# Patient Record
Sex: FEMALE | URBAN - METROPOLITAN AREA
[De-identification: names, ages, dates, MRNs, and addresses within clinical notes are randomized per-mention and may not be internally consistent; named-entity substitution may affect disease eponyms.]

---

## 2019-02-21 ENCOUNTER — APPOINTMENT (OUTPATIENT)
Dept: URBAN - METROPOLITAN AREA CLINIC 319 | Age: 74
Setting detail: DERMATOLOGY
End: 2019-02-21

## 2019-02-21 DIAGNOSIS — R23.3 SPONTANEOUS ECCHYMOSES: ICD-10-CM

## 2019-02-21 PROCEDURE — OTHER COUNSELING: OTHER

## 2019-02-21 PROCEDURE — OTHER TREATMENT REGIMEN: OTHER

## 2019-02-21 PROCEDURE — 99202 OFFICE O/P NEW SF 15 MIN: CPT

## 2019-02-21 PROCEDURE — OTHER MIPS QUALITY: OTHER

## 2019-02-21 ASSESSMENT — LOCATION SIMPLE DESCRIPTION DERM: LOCATION SIMPLE: LEFT KNEE

## 2019-02-21 ASSESSMENT — LOCATION DETAILED DESCRIPTION DERM: LOCATION DETAILED: LEFT KNEE

## 2019-02-21 ASSESSMENT — LOCATION ZONE DERM: LOCATION ZONE: LEG

## 2019-02-21 NOTE — HPI: DISCOLORATION
How Severe Is Your Skin Discoloration?: mild
Additional History: Patient states she got a steroid injection in her left knee back in August [address] by Dr. Hanna. Patient states every since then she has had a discoloration at the injection site. She stated it never completely goes away, but will get smaller over time. Patient states nothing makes it better or worse.

## 2020-06-09 ENCOUNTER — APPOINTMENT (OUTPATIENT)
Dept: URBAN - METROPOLITAN AREA CLINIC 320 | Age: 75
Setting detail: DERMATOLOGY
End: 2020-06-15

## 2020-06-09 DIAGNOSIS — Z41.9 ENCOUNTER FOR PROCEDURE FOR PURPOSES OTHER THAN REMEDYING HEALTH STATE, UNSPECIFIED: ICD-10-CM

## 2020-06-09 PROCEDURE — OTHER JUVEDERM ULTRA PLUS XC INJECTION: OTHER

## 2020-06-09 PROCEDURE — OTHER BOTOX: OTHER

## 2020-06-09 ASSESSMENT — LOCATION DETAILED DESCRIPTION DERM
LOCATION DETAILED: LEFT MEDIAL FOREHEAD
LOCATION DETAILED: RIGHT SUPERIOR MEDIAL FOREHEAD
LOCATION DETAILED: LEFT SUPERIOR LATERAL MALAR CHEEK
LOCATION DETAILED: LEFT SUPERIOR LATERAL FOREHEAD
LOCATION DETAILED: GLABELLA
LOCATION DETAILED: RIGHT MEDIAL FOREHEAD
LOCATION DETAILED: LEFT FOREHEAD
LOCATION DETAILED: RIGHT LATERAL EYEBROW
LOCATION DETAILED: RIGHT SUPERIOR CENTRAL MALAR CHEEK
LOCATION DETAILED: RIGHT SUPERIOR FOREHEAD
LOCATION DETAILED: LEFT INFERIOR TEMPLE
LOCATION DETAILED: LEFT LATERAL FOREHEAD
LOCATION DETAILED: LEFT SUPERIOR MEDIAL FOREHEAD
LOCATION DETAILED: RIGHT INFERIOR TEMPLE
LOCATION DETAILED: RIGHT CENTRAL EYEBROW
LOCATION DETAILED: LEFT INFERIOR MEDIAL FOREHEAD
LOCATION DETAILED: LEFT LATERAL EYEBROW
LOCATION DETAILED: RIGHT FOREHEAD
LOCATION DETAILED: LEFT SUPERIOR FOREHEAD

## 2020-06-09 ASSESSMENT — LOCATION SIMPLE DESCRIPTION DERM
LOCATION SIMPLE: RIGHT TEMPLE
LOCATION SIMPLE: LEFT EYEBROW
LOCATION SIMPLE: LEFT TEMPLE
LOCATION SIMPLE: LEFT FOREHEAD
LOCATION SIMPLE: RIGHT CHEEK
LOCATION SIMPLE: RIGHT FOREHEAD
LOCATION SIMPLE: LEFT CHEEK
LOCATION SIMPLE: GLABELLA
LOCATION SIMPLE: RIGHT EYEBROW

## 2020-06-09 ASSESSMENT — LOCATION ZONE DERM: LOCATION ZONE: FACE

## 2020-06-09 NOTE — PROCEDURE: BOTOX
Price (Use Numbers Only, No Special Characters Or $): 949 Price (Use Numbers Only, No Special Characters Or $): 179

## 2020-06-09 NOTE — PROCEDURE: JUVEDERM ULTRA PLUS XC INJECTION
Price (Use Numbers Only, No Special Characters Or $): 693 Price (Use Numbers Only, No Special Characters Or $): 362

## 2020-06-10 ENCOUNTER — APPOINTMENT (OUTPATIENT)
Dept: URBAN - METROPOLITAN AREA CLINIC 320 | Age: 75
Setting detail: DERMATOLOGY
End: 2020-06-10

## 2020-06-23 ENCOUNTER — APPOINTMENT (OUTPATIENT)
Dept: URBAN - METROPOLITAN AREA CLINIC 320 | Age: 75
Setting detail: DERMATOLOGY
End: 2020-06-26

## 2020-06-23 DIAGNOSIS — Z41.9 ENCOUNTER FOR PROCEDURE FOR PURPOSES OTHER THAN REMEDYING HEALTH STATE, UNSPECIFIED: ICD-10-CM

## 2020-06-23 PROCEDURE — OTHER LUMENIS M22: OTHER

## 2020-06-23 ASSESSMENT — LOCATION SIMPLE DESCRIPTION DERM
LOCATION SIMPLE: LEFT CHEEK
LOCATION SIMPLE: RIGHT CHEEK
LOCATION SIMPLE: LEFT FOREHEAD
LOCATION SIMPLE: RIGHT FOREHEAD

## 2020-06-23 ASSESSMENT — LOCATION DETAILED DESCRIPTION DERM
LOCATION DETAILED: LEFT INFERIOR CENTRAL MALAR CHEEK
LOCATION DETAILED: LEFT FOREHEAD
LOCATION DETAILED: RIGHT FOREHEAD
LOCATION DETAILED: LEFT MEDIAL FOREHEAD
LOCATION DETAILED: RIGHT INFERIOR CENTRAL MALAR CHEEK

## 2020-06-23 ASSESSMENT — LOCATION ZONE DERM: LOCATION ZONE: FACE

## 2020-06-23 NOTE — PROCEDURE: LUMENIS M22
Treated Area: large area
External Cooling Fan Speed: 0
Topical Anesthesia?: BLT cream (benzocaine 20%, lidocaine 6%, tetracaine 4%)
Price (Use Numbers Only, No Special Characters Or $): 400
Fluence Units: J/cm2
Number Of Passes: 2
Pulse Delay (In Milliseconds): 5
Procedure Text: The patient's skin was cleaned and the procedure was performed using the parameters noted above.
Length Of Topical Anesthesia Application (Optional): 15 minutes
Total Pulses: 95
Post-Care Instructions: I reviewed with the patient in detail post-care instructions. Patient should stay away from the sun and wear sun protection until treated areas are fully healed.
Treatment Number: 1
Pulse Duration (In Milliseconds): 3
Skin Type (Optional): III
Detail Level: Zone
Fluence: 14
Consent: Written consent obtained, risks reviewed including but not limited to crusting, scabbing, blistering, scarring, darker or lighter pigmentary change, bruising, and/or incomplete response.

## 2020-07-23 ENCOUNTER — APPOINTMENT (OUTPATIENT)
Dept: URBAN - METROPOLITAN AREA CLINIC 320 | Age: 75
Setting detail: DERMATOLOGY
End: 2020-07-28

## 2020-07-23 DIAGNOSIS — Z41.9 ENCOUNTER FOR PROCEDURE FOR PURPOSES OTHER THAN REMEDYING HEALTH STATE, UNSPECIFIED: ICD-10-CM

## 2020-07-23 PROCEDURE — OTHER RESTYLANE LYFT INJECTION: OTHER

## 2020-07-23 PROCEDURE — OTHER BOTOX: OTHER

## 2020-07-23 ASSESSMENT — LOCATION DETAILED DESCRIPTION DERM
LOCATION DETAILED: RIGHT INFERIOR FOREHEAD
LOCATION DETAILED: LEFT INFERIOR FOREHEAD
LOCATION DETAILED: LEFT MID TEMPLE
LOCATION DETAILED: LEFT INFERIOR TEMPLE
LOCATION DETAILED: LEFT INFERIOR LATERAL FOREHEAD
LOCATION DETAILED: RIGHT MID TEMPLE
LOCATION DETAILED: RIGHT INFERIOR TEMPLE
LOCATION DETAILED: LEFT SUPERIOR LATERAL MALAR CHEEK

## 2020-07-23 ASSESSMENT — LOCATION SIMPLE DESCRIPTION DERM
LOCATION SIMPLE: RIGHT TEMPLE
LOCATION SIMPLE: LEFT FOREHEAD
LOCATION SIMPLE: LEFT CHEEK
LOCATION SIMPLE: LEFT TEMPLE
LOCATION SIMPLE: RIGHT FOREHEAD

## 2020-07-23 ASSESSMENT — LOCATION ZONE DERM: LOCATION ZONE: FACE

## 2020-07-23 NOTE — PROCEDURE: BOTOX
Price (Use Numbers Only, No Special Characters Or $): 165 Price (Use Numbers Only, No Special Characters Or $): 979

## 2020-07-23 NOTE — PROCEDURE: RESTYLANE LYFT INJECTION
Price (Use Numbers Only, No Special Characters Or $): 335 Price (Use Numbers Only, No Special Characters Or $): 934

## 2020-10-27 ENCOUNTER — APPOINTMENT (OUTPATIENT)
Dept: URBAN - METROPOLITAN AREA CLINIC 320 | Age: 75
Setting detail: DERMATOLOGY
End: 2020-11-09

## 2020-10-27 DIAGNOSIS — Z41.9 ENCOUNTER FOR PROCEDURE FOR PURPOSES OTHER THAN REMEDYING HEALTH STATE, UNSPECIFIED: ICD-10-CM

## 2020-10-27 PROCEDURE — OTHER LUMENIS M22: OTHER

## 2020-10-27 ASSESSMENT — LOCATION DETAILED DESCRIPTION DERM
LOCATION DETAILED: LEFT INFERIOR CENTRAL MALAR CHEEK
LOCATION DETAILED: NASAL SUPRATIP
LOCATION DETAILED: INFERIOR MID FOREHEAD
LOCATION DETAILED: RIGHT CHIN
LOCATION DETAILED: RIGHT INFERIOR CENTRAL MALAR CHEEK

## 2020-10-27 ASSESSMENT — LOCATION ZONE DERM
LOCATION ZONE: FACE
LOCATION ZONE: NOSE

## 2020-10-27 ASSESSMENT — LOCATION SIMPLE DESCRIPTION DERM
LOCATION SIMPLE: CHIN
LOCATION SIMPLE: LEFT CHEEK
LOCATION SIMPLE: INFERIOR FOREHEAD
LOCATION SIMPLE: RIGHT CHEEK
LOCATION SIMPLE: NOSE

## 2020-10-27 NOTE — PROCEDURE: LUMENIS M22
Detail Level: Zone
Treatment Number: 1
Procedure Text: The patient's skin was cleaned and the procedure was performed using the parameters noted above.
External Cooling Fan Speed: 0
Topical Anesthesia?: BLT cream (benzocaine 20%, lidocaine 6%, tetracaine 4%)
Skin Type (Optional): III
Fluence: 13
Consent: Written consent obtained, risks reviewed including but not limited to crusting, scabbing, blistering, scarring, darker or lighter pigmentary change, bruising, and/or incomplete response.
Total Pulses: 123
Length Of Topical Anesthesia Application (Optional): 20 minutes
Treated Area: large area
Pulse Duration (In Milliseconds): 3
Number Of Passes: 2
Fluence Units: J/cm2
Pulse Delay (In Milliseconds): 5
Post-Care Instructions: I reviewed with the patient in detail post-care instructions. Patient should stay away from the sun and wear sun protection until treated areas are fully healed.
Price (Use Numbers Only, No Special Characters Or $): 400

## 2021-01-26 ENCOUNTER — APPOINTMENT (OUTPATIENT)
Dept: URBAN - METROPOLITAN AREA CLINIC 320 | Age: 76
Setting detail: DERMATOLOGY
End: 2021-01-26

## 2021-01-26 DIAGNOSIS — Z41.9 ENCOUNTER FOR PROCEDURE FOR PURPOSES OTHER THAN REMEDYING HEALTH STATE, UNSPECIFIED: ICD-10-CM

## 2021-01-26 PROCEDURE — OTHER PRESCRIPTION: OTHER

## 2021-01-26 RX ORDER — PIMECROLIMUS 10 MG/G
1% CREAM TOPICAL
Qty: 1 | Refills: 1 | COMMUNITY
Start: 2021-01-26

## 2021-01-27 ENCOUNTER — APPOINTMENT (OUTPATIENT)
Dept: URBAN - METROPOLITAN AREA CLINIC 320 | Age: 76
Setting detail: DERMATOLOGY
End: 2021-01-29

## 2021-01-27 DIAGNOSIS — Z41.9 ENCOUNTER FOR PROCEDURE FOR PURPOSES OTHER THAN REMEDYING HEALTH STATE, UNSPECIFIED: ICD-10-CM

## 2021-01-27 PROCEDURE — OTHER PRESCRIPTION: OTHER

## 2021-01-27 PROCEDURE — OTHER BELOTERO INJECTION: OTHER

## 2021-01-27 RX ORDER — PIMECROLIMUS 10 MG/G
1% CREAM TOPICAL TWICE A DAY
Qty: 1 | Refills: 1 | Status: CANCELLED

## 2021-03-30 ENCOUNTER — APPOINTMENT (OUTPATIENT)
Dept: URBAN - METROPOLITAN AREA CLINIC 320 | Age: 76
Setting detail: DERMATOLOGY
End: 2021-03-31

## 2021-03-30 DIAGNOSIS — Z41.9 ENCOUNTER FOR PROCEDURE FOR PURPOSES OTHER THAN REMEDYING HEALTH STATE, UNSPECIFIED: ICD-10-CM

## 2021-03-30 PROCEDURE — OTHER BOTOX: OTHER

## 2021-03-30 ASSESSMENT — LOCATION SIMPLE DESCRIPTION DERM
LOCATION SIMPLE: LEFT TEMPLE
LOCATION SIMPLE: RIGHT TEMPLE
LOCATION SIMPLE: LEFT CHEEK
LOCATION SIMPLE: RIGHT CHEEK

## 2021-03-30 ASSESSMENT — LOCATION DETAILED DESCRIPTION DERM
LOCATION DETAILED: LEFT INFERIOR TEMPLE
LOCATION DETAILED: LEFT SUPERIOR LATERAL MALAR CHEEK
LOCATION DETAILED: RIGHT MID TEMPLE
LOCATION DETAILED: RIGHT SUPERIOR LATERAL MALAR CHEEK
LOCATION DETAILED: RIGHT INFERIOR TEMPLE
LOCATION DETAILED: LEFT MID TEMPLE

## 2021-03-30 ASSESSMENT — LOCATION ZONE DERM: LOCATION ZONE: FACE

## 2021-04-14 ENCOUNTER — APPOINTMENT (OUTPATIENT)
Dept: URBAN - METROPOLITAN AREA CLINIC 320 | Age: 76
Setting detail: DERMATOLOGY
End: 2021-04-14

## 2021-04-14 ENCOUNTER — APPOINTMENT (OUTPATIENT)
Dept: URBAN - METROPOLITAN AREA CLINIC 320 | Age: 76
Setting detail: DERMATOLOGY
End: 2021-04-16

## 2021-04-14 DIAGNOSIS — Z41.9 ENCOUNTER FOR PROCEDURE FOR PURPOSES OTHER THAN REMEDYING HEALTH STATE, UNSPECIFIED: ICD-10-CM

## 2021-04-14 PROCEDURE — OTHER JUVEDERM VOLBELLA INJECTION: OTHER

## 2021-04-14 ASSESSMENT — LOCATION SIMPLE DESCRIPTION DERM: LOCATION SIMPLE: RIGHT CHEEK

## 2021-04-14 ASSESSMENT — LOCATION ZONE DERM: LOCATION ZONE: FACE

## 2021-04-14 ASSESSMENT — LOCATION DETAILED DESCRIPTION DERM: LOCATION DETAILED: RIGHT CENTRAL MALAR CHEEK

## 2021-04-14 NOTE — PROCEDURE: JUVEDERM VOLBELLA INJECTION
Price (Use Numbers Only, No Special Characters Or $): 117 Price (Use Numbers Only, No Special Characters Or $): 013

## 2021-08-18 ENCOUNTER — APPOINTMENT (OUTPATIENT)
Dept: URBAN - METROPOLITAN AREA CLINIC 320 | Age: 76
Setting detail: DERMATOLOGY
End: 2021-08-22

## 2021-08-18 DIAGNOSIS — Z41.9 ENCOUNTER FOR PROCEDURE FOR PURPOSES OTHER THAN REMEDYING HEALTH STATE, UNSPECIFIED: ICD-10-CM

## 2021-08-18 PROCEDURE — OTHER BOTOX: OTHER

## 2021-08-18 ASSESSMENT — LOCATION SIMPLE DESCRIPTION DERM
LOCATION SIMPLE: RIGHT EYEBROW
LOCATION SIMPLE: LEFT FOREHEAD
LOCATION SIMPLE: RIGHT FOREHEAD
LOCATION SIMPLE: RIGHT TEMPLE
LOCATION SIMPLE: LEFT TEMPLE
LOCATION SIMPLE: LEFT CHEEK
LOCATION SIMPLE: RIGHT CHEEK
LOCATION SIMPLE: GLABELLA

## 2021-08-18 ASSESSMENT — LOCATION DETAILED DESCRIPTION DERM
LOCATION DETAILED: RIGHT SUPERIOR LATERAL MALAR CHEEK
LOCATION DETAILED: LEFT SUPERIOR LATERAL MALAR CHEEK
LOCATION DETAILED: LEFT INFERIOR MEDIAL FOREHEAD
LOCATION DETAILED: LEFT MID TEMPLE
LOCATION DETAILED: LEFT INFERIOR TEMPLE
LOCATION DETAILED: RIGHT INFERIOR TEMPLE
LOCATION DETAILED: RIGHT SUPERIOR CENTRAL MALAR CHEEK
LOCATION DETAILED: RIGHT LATERAL EYEBROW
LOCATION DETAILED: RIGHT INFERIOR MEDIAL FOREHEAD
LOCATION DETAILED: GLABELLA
LOCATION DETAILED: RIGHT CENTRAL EYEBROW
LOCATION DETAILED: LEFT SUPERIOR CENTRAL MALAR CHEEK

## 2021-08-18 ASSESSMENT — LOCATION ZONE DERM: LOCATION ZONE: FACE

## 2021-08-24 ENCOUNTER — APPOINTMENT (OUTPATIENT)
Dept: URBAN - METROPOLITAN AREA CLINIC 319 | Age: 76
Setting detail: DERMATOLOGY
End: 2021-08-24

## 2021-08-24 DIAGNOSIS — L71.0 PERIORAL DERMATITIS: ICD-10-CM

## 2021-08-24 PROCEDURE — OTHER COUNSELING: OTHER

## 2021-08-24 PROCEDURE — OTHER PHOTO-DOCUMENTATION: OTHER

## 2021-08-24 PROCEDURE — OTHER TREATMENT REGIMEN: OTHER

## 2021-08-24 PROCEDURE — OTHER PRESCRIPTION: OTHER

## 2021-08-24 PROCEDURE — 99213 OFFICE O/P EST LOW 20 MIN: CPT

## 2021-08-24 RX ORDER — DESONIDE 0.5 MG/G
CREAM TOPICAL
Qty: 1 | Refills: 3 | Status: ERX | COMMUNITY
Start: 2021-08-24

## 2021-08-24 RX ORDER — CLINDAMYCIN PHOSPHATE 10 MG/G
GEL TOPICAL
Qty: 1 | Refills: 2 | Status: ERX | COMMUNITY
Start: 2021-08-24

## 2021-08-24 ASSESSMENT — INVESTIGATOR STATIC GLOBAL ASSESSMENT: IN YOUR EXPERIENCE, AMONG ALL PATIENTS YOU HAVE SEEN WITH THIS CONDITION, HOW SEVERE IS THIS PATIENT'S CONDITION?: MILD

## 2021-08-24 ASSESSMENT — LOCATION SIMPLE DESCRIPTION DERM
LOCATION SIMPLE: LEFT CHEEK
LOCATION SIMPLE: CHIN
LOCATION SIMPLE: RIGHT CHEEK
LOCATION SIMPLE: CHIN
LOCATION SIMPLE: RIGHT CHEEK

## 2021-08-24 ASSESSMENT — LOCATION ZONE DERM
LOCATION ZONE: FACE
LOCATION ZONE: FACE

## 2021-08-24 ASSESSMENT — LOCATION DETAILED DESCRIPTION DERM
LOCATION DETAILED: RIGHT CHIN
LOCATION DETAILED: RIGHT INFERIOR CENTRAL MALAR CHEEK
LOCATION DETAILED: LEFT CHIN
LOCATION DETAILED: LEFT SUPERIOR MEDIAL BUCCAL CHEEK
LOCATION DETAILED: RIGHT INFERIOR CENTRAL MALAR CHEEK

## 2021-08-24 NOTE — PROCEDURE: TREATMENT REGIMEN
Otc Regimen: Use oil free products
Initiate Treatment: Doxycycline 100 mg take one tablet daily x one month
Detail Level: Detailed
Discontinue Regimen: Mupirocin \\nTriamcinolone
Plan: When patient runs out of doxycycline 100mg will call in doxycycline 50 mg to take one twice a day per Dr. Matamoros

## 2021-09-01 ENCOUNTER — APPOINTMENT (OUTPATIENT)
Dept: URBAN - METROPOLITAN AREA CLINIC 320 | Age: 76
Setting detail: DERMATOLOGY
End: 2021-09-01

## 2021-09-01 DIAGNOSIS — Z41.9 ENCOUNTER FOR PROCEDURE FOR PURPOSES OTHER THAN REMEDYING HEALTH STATE, UNSPECIFIED: ICD-10-CM

## 2021-09-01 PROCEDURE — OTHER COSMETIC FOLLOW-UP: OTHER

## 2021-09-01 NOTE — PROCEDURE: COSMETIC FOLLOW-UP
Detail Level: Detailed
Price (Use Numbers Only, No Special Characters Or $): 0
Comments (Free Text): Patient follow up after Botox treatment for glabella and crows.\\n\\nPatient pleased with results.

## 2021-11-11 ENCOUNTER — APPOINTMENT (OUTPATIENT)
Dept: URBAN - METROPOLITAN AREA CLINIC 320 | Age: 76
Setting detail: DERMATOLOGY
End: 2022-03-01

## 2021-11-11 DIAGNOSIS — Z41.9 ENCOUNTER FOR PROCEDURE FOR PURPOSES OTHER THAN REMEDYING HEALTH STATE, UNSPECIFIED: ICD-10-CM

## 2021-11-11 PROCEDURE — OTHER LUMENIS M22: OTHER

## 2021-11-11 ASSESSMENT — LOCATION DETAILED DESCRIPTION DERM
LOCATION DETAILED: RIGHT INFERIOR CENTRAL MALAR CHEEK
LOCATION DETAILED: RIGHT MEDIAL FOREHEAD
LOCATION DETAILED: LEFT INFERIOR CENTRAL MALAR CHEEK
LOCATION DETAILED: RIGHT CHIN
LOCATION DETAILED: NASAL SUPRATIP
LOCATION DETAILED: RIGHT LATERAL FOREHEAD

## 2021-11-11 ASSESSMENT — LOCATION ZONE DERM
LOCATION ZONE: NOSE
LOCATION ZONE: FACE

## 2021-11-11 ASSESSMENT — LOCATION SIMPLE DESCRIPTION DERM
LOCATION SIMPLE: RIGHT FOREHEAD
LOCATION SIMPLE: NOSE
LOCATION SIMPLE: CHIN
LOCATION SIMPLE: RIGHT CHEEK
LOCATION SIMPLE: LEFT CHEEK

## 2021-11-11 NOTE — PROCEDURE: LUMENIS M22
Treatment Number: 1
Fluence Units: J/cm2
Procedure Text: The patient's skin was cleaned and the procedure was performed using the parameters noted above.
Post-Care Instructions: I reviewed with the patient in detail post-care instructions. Patient should stay away from the sun and wear sun protection until treated areas are fully healed.
External Cooling Fan Speed: 0
Consent: Written consent obtained, risks reviewed including but not limited to crusting, scabbing, blistering, scarring, darker or lighter pigmentary change, bruising, and/or incomplete response.
Length Of Topical Anesthesia Application (Optional): 15 minutes
Skin Type (Optional): III
Topical Anesthesia?: BLT cream (benzocaine 10%, lidocaine 4%, tetracaine 2%)
Number Of Passes: 2
Detail Level: Zone
Total Pulses: 102
Fluence: 10
Pulse Duration (In Milliseconds): 3

## 2022-01-11 ENCOUNTER — APPOINTMENT (OUTPATIENT)
Dept: URBAN - METROPOLITAN AREA CLINIC 320 | Age: 77
Setting detail: DERMATOLOGY
End: 2022-03-01

## 2022-01-11 DIAGNOSIS — Z41.9 ENCOUNTER FOR PROCEDURE FOR PURPOSES OTHER THAN REMEDYING HEALTH STATE, UNSPECIFIED: ICD-10-CM

## 2022-01-11 PROCEDURE — OTHER VOLBELLA INJECTION: OTHER

## 2022-01-11 PROCEDURE — OTHER TEOSYAL RHA 4 INJECTION: OTHER

## 2022-01-11 PROCEDURE — OTHER BOTOX: OTHER

## 2022-01-11 ASSESSMENT — LOCATION DETAILED DESCRIPTION DERM
LOCATION DETAILED: RIGHT FOREHEAD
LOCATION DETAILED: LEFT MEDIAL FOREHEAD
LOCATION DETAILED: SUPERIOR MID FOREHEAD
LOCATION DETAILED: LEFT SUPERIOR CENTRAL MALAR CHEEK
LOCATION DETAILED: LEFT LATERAL FOREHEAD
LOCATION DETAILED: RIGHT SUPERIOR LATERAL MALAR CHEEK
LOCATION DETAILED: RIGHT SUPERIOR FOREHEAD
LOCATION DETAILED: LEFT INFERIOR TEMPLE
LOCATION DETAILED: LEFT SUPERIOR FOREHEAD
LOCATION DETAILED: LEFT MID TEMPLE
LOCATION DETAILED: RIGHT INFERIOR TEMPLE
LOCATION DETAILED: LEFT SUPERIOR LATERAL FOREHEAD
LOCATION DETAILED: LEFT FOREHEAD
LOCATION DETAILED: RIGHT MID TEMPLE
LOCATION DETAILED: RIGHT MEDIAL FOREHEAD

## 2022-01-11 ASSESSMENT — LOCATION SIMPLE DESCRIPTION DERM
LOCATION SIMPLE: SUPERIOR FOREHEAD
LOCATION SIMPLE: RIGHT FOREHEAD
LOCATION SIMPLE: RIGHT CHEEK
LOCATION SIMPLE: LEFT CHEEK
LOCATION SIMPLE: LEFT TEMPLE
LOCATION SIMPLE: LEFT FOREHEAD
LOCATION SIMPLE: RIGHT TEMPLE

## 2022-01-11 ASSESSMENT — LOCATION ZONE DERM: LOCATION ZONE: FACE

## 2022-01-25 ENCOUNTER — APPOINTMENT (OUTPATIENT)
Dept: URBAN - METROPOLITAN AREA CLINIC 320 | Age: 77
Setting detail: DERMATOLOGY
End: 2022-03-01

## 2022-01-25 DIAGNOSIS — Z41.9 ENCOUNTER FOR PROCEDURE FOR PURPOSES OTHER THAN REMEDYING HEALTH STATE, UNSPECIFIED: ICD-10-CM

## 2022-01-25 PROCEDURE — OTHER COSMETIC FOLLOW-UP: OTHER

## 2022-01-25 NOTE — PROCEDURE: COSMETIC FOLLOW-UP
Comments (Free Text): Patient follow up after Botox treatment on forehead and crows.\\n\\nPatient pleased with results.
Detail Level: Zone

## 2022-03-01 ENCOUNTER — APPOINTMENT (OUTPATIENT)
Dept: URBAN - METROPOLITAN AREA CLINIC 320 | Age: 77
Setting detail: DERMATOLOGY
End: 2022-05-26

## 2022-03-01 DIAGNOSIS — Z41.9 ENCOUNTER FOR PROCEDURE FOR PURPOSES OTHER THAN REMEDYING HEALTH STATE, UNSPECIFIED: ICD-10-CM

## 2022-03-01 PROCEDURE — OTHER TEOSYAL RHA 3 INJECTION: OTHER

## 2022-04-14 ENCOUNTER — APPOINTMENT (OUTPATIENT)
Dept: URBAN - METROPOLITAN AREA CLINIC 320 | Age: 77
Setting detail: DERMATOLOGY
End: 2022-04-14

## 2022-04-14 DIAGNOSIS — Z41.9 ENCOUNTER FOR PROCEDURE FOR PURPOSES OTHER THAN REMEDYING HEALTH STATE, UNSPECIFIED: ICD-10-CM

## 2022-04-14 PROCEDURE — OTHER LUMENIS M22: OTHER

## 2022-04-14 ASSESSMENT — LOCATION DETAILED DESCRIPTION DERM
LOCATION DETAILED: RIGHT INFERIOR CENTRAL MALAR CHEEK
LOCATION DETAILED: LEFT INFERIOR MEDIAL MALAR CHEEK
LOCATION DETAILED: LEFT INFERIOR LATERAL FOREHEAD
LOCATION DETAILED: INFERIOR MID FOREHEAD
LOCATION DETAILED: RIGHT LOWER CUTANEOUS LIP
LOCATION DETAILED: NASAL DORSUM
LOCATION DETAILED: RIGHT INFERIOR FOREHEAD

## 2022-04-14 ASSESSMENT — LOCATION ZONE DERM
LOCATION ZONE: NOSE
LOCATION ZONE: LIP
LOCATION ZONE: FACE

## 2022-04-14 ASSESSMENT — LOCATION SIMPLE DESCRIPTION DERM
LOCATION SIMPLE: NOSE
LOCATION SIMPLE: LEFT CHEEK
LOCATION SIMPLE: LEFT FOREHEAD
LOCATION SIMPLE: RIGHT FOREHEAD
LOCATION SIMPLE: RIGHT LIP
LOCATION SIMPLE: RIGHT CHEEK
LOCATION SIMPLE: INFERIOR FOREHEAD

## 2022-04-14 NOTE — PROCEDURE: LUMENIS M22
Number Of Passes: 2
Topical Anesthesia?: BLT cream (benzocaine 10%, lidocaine 4%, tetracaine 2%)
Treatment Number: 1
Detail Level: Zone
Procedure Text: The patient's skin was cleaned and the procedure was performed using the parameters noted above.
Skin Type (Optional): III
External Cooling Fan Speed: 0
Consent: Written consent obtained, risks reviewed including but not limited to crusting, scabbing, blistering, scarring, darker or lighter pigmentary change, bruising, and/or incomplete response.
Treated Area: large area
Pulse Duration (In Milliseconds): 3
Fluence Units: J/cm2
Fluence: 12
Post-Care Instructions: I reviewed with the patient in detail post-care instructions. Patient should stay away from the sun and wear sun protection until treated areas are fully healed.
Length Of Topical Anesthesia Application (Optional): 15 minutes

## 2022-04-28 ENCOUNTER — APPOINTMENT (OUTPATIENT)
Dept: URBAN - METROPOLITAN AREA CLINIC 320 | Age: 77
Setting detail: DERMATOLOGY
End: 2022-05-20

## 2022-04-28 DIAGNOSIS — Z41.9 ENCOUNTER FOR PROCEDURE FOR PURPOSES OTHER THAN REMEDYING HEALTH STATE, UNSPECIFIED: ICD-10-CM

## 2022-04-28 PROCEDURE — OTHER TEOSYAL RHA 3 INJECTION: OTHER

## 2022-04-28 ASSESSMENT — LOCATION DETAILED DESCRIPTION DERM
LOCATION DETAILED: LEFT INFERIOR CENTRAL MALAR CHEEK
LOCATION DETAILED: RIGHT INFERIOR CENTRAL MALAR CHEEK
LOCATION DETAILED: RIGHT CENTRAL BUCCAL CHEEK

## 2022-04-28 ASSESSMENT — LOCATION SIMPLE DESCRIPTION DERM
LOCATION SIMPLE: RIGHT CHEEK
LOCATION SIMPLE: LEFT CHEEK

## 2022-04-28 ASSESSMENT — LOCATION ZONE DERM: LOCATION ZONE: FACE

## 2022-05-26 ENCOUNTER — APPOINTMENT (OUTPATIENT)
Dept: URBAN - METROPOLITAN AREA CLINIC 320 | Age: 77
Setting detail: DERMATOLOGY
End: 2022-05-26

## 2022-05-26 DIAGNOSIS — Z41.9 ENCOUNTER FOR PROCEDURE FOR PURPOSES OTHER THAN REMEDYING HEALTH STATE, UNSPECIFIED: ICD-10-CM

## 2022-05-26 PROCEDURE — OTHER TEOSYAL RHA 2 INJECTION: OTHER

## 2022-08-04 ENCOUNTER — APPOINTMENT (OUTPATIENT)
Dept: URBAN - METROPOLITAN AREA CLINIC 320 | Age: 77
Setting detail: DERMATOLOGY
End: 2022-08-04

## 2022-08-04 DIAGNOSIS — Z41.9 ENCOUNTER FOR PROCEDURE FOR PURPOSES OTHER THAN REMEDYING HEALTH STATE, UNSPECIFIED: ICD-10-CM

## 2022-08-04 PROCEDURE — OTHER BOTOX: OTHER

## 2022-08-04 ASSESSMENT — LOCATION DETAILED DESCRIPTION DERM
LOCATION DETAILED: INFERIOR MID FOREHEAD
LOCATION DETAILED: RIGHT INFERIOR MEDIAL FOREHEAD
LOCATION DETAILED: RIGHT INFERIOR FOREHEAD
LOCATION DETAILED: LEFT INFERIOR FOREHEAD
LOCATION DETAILED: LEFT INFERIOR MEDIAL FOREHEAD

## 2022-08-04 ASSESSMENT — LOCATION SIMPLE DESCRIPTION DERM
LOCATION SIMPLE: LEFT FOREHEAD
LOCATION SIMPLE: RIGHT FOREHEAD
LOCATION SIMPLE: INFERIOR FOREHEAD

## 2022-08-04 ASSESSMENT — LOCATION ZONE DERM: LOCATION ZONE: FACE

## 2022-08-25 ENCOUNTER — APPOINTMENT (OUTPATIENT)
Dept: URBAN - METROPOLITAN AREA CLINIC 320 | Age: 77
Setting detail: DERMATOLOGY
End: 2022-08-25

## 2022-08-25 DIAGNOSIS — Z41.9 ENCOUNTER FOR PROCEDURE FOR PURPOSES OTHER THAN REMEDYING HEALTH STATE, UNSPECIFIED: ICD-10-CM

## 2022-08-25 PROCEDURE — OTHER LUMENIS M22: OTHER

## 2022-08-25 ASSESSMENT — LOCATION DETAILED DESCRIPTION DERM
LOCATION DETAILED: RIGHT CLAVICULAR SKIN
LOCATION DETAILED: LEFT INFERIOR LATERAL NECK
LOCATION DETAILED: LEFT INFERIOR ANTERIOR NECK
LOCATION DETAILED: MIDDLE STERNUM
LOCATION DETAILED: RIGHT SUPERIOR ANTERIOR NECK
LOCATION DETAILED: STERNAL NOTCH
LOCATION DETAILED: LEFT CLAVICULAR SKIN

## 2022-08-25 ASSESSMENT — LOCATION SIMPLE DESCRIPTION DERM
LOCATION SIMPLE: LEFT ANTERIOR NECK
LOCATION SIMPLE: CHEST
LOCATION SIMPLE: LEFT CLAVICULAR SKIN
LOCATION SIMPLE: RIGHT CLAVICULAR SKIN
LOCATION SIMPLE: RIGHT ANTERIOR NECK

## 2022-08-25 ASSESSMENT — LOCATION ZONE DERM
LOCATION ZONE: TRUNK
LOCATION ZONE: NECK

## 2022-08-25 NOTE — PROCEDURE: LUMENIS M22
Fluence Units: J/cm2
Treatment Number: 1
Length Of Topical Anesthesia Application (Optional): 20 minutes
Treated Area: large area
Topical Anesthesia?: BLT cream (benzocaine 10%, lidocaine 4%, tetracaine 2%)
Pulse Duration (In Milliseconds): 3
Fluence: 12
Consent: Written consent obtained, risks reviewed including but not limited to crusting, scabbing, blistering, scarring, darker or lighter pigmentary change, bruising, and/or incomplete response.
Total Pulses: 212
Skin Type (Optional): III
Procedure Text: The patient's skin was cleaned and the procedure was performed using the parameters noted above.
Post-Care Instructions: I reviewed with the patient in detail post-care instructions. Patient should stay away from the sun and wear sun protection until treated areas are fully healed.
External Cooling Fan Speed: 0
Detail Level: Zone
Number Of Passes: 2

## 2022-10-04 ENCOUNTER — APPOINTMENT (OUTPATIENT)
Dept: URBAN - METROPOLITAN AREA CLINIC 320 | Age: 77
Setting detail: DERMATOLOGY
End: 2022-10-04

## 2022-10-04 DIAGNOSIS — Z41.9 ENCOUNTER FOR PROCEDURE FOR PURPOSES OTHER THAN REMEDYING HEALTH STATE, UNSPECIFIED: ICD-10-CM

## 2022-10-04 PROCEDURE — OTHER LUMENIS M22: OTHER

## 2022-10-04 ASSESSMENT — LOCATION DETAILED DESCRIPTION DERM
LOCATION DETAILED: LEFT LOWER CUTANEOUS LIP
LOCATION DETAILED: LEFT INFERIOR CENTRAL MALAR CHEEK
LOCATION DETAILED: INFERIOR MID FOREHEAD
LOCATION DETAILED: NASAL SUPRATIP
LOCATION DETAILED: RIGHT INFERIOR CENTRAL MALAR CHEEK
LOCATION DETAILED: RIGHT INFERIOR LATERAL FOREHEAD
LOCATION DETAILED: LEFT LATERAL FOREHEAD

## 2022-10-04 ASSESSMENT — LOCATION SIMPLE DESCRIPTION DERM
LOCATION SIMPLE: LEFT FOREHEAD
LOCATION SIMPLE: RIGHT CHEEK
LOCATION SIMPLE: RIGHT FOREHEAD
LOCATION SIMPLE: LEFT LIP
LOCATION SIMPLE: NOSE
LOCATION SIMPLE: LEFT CHEEK
LOCATION SIMPLE: INFERIOR FOREHEAD

## 2022-10-04 ASSESSMENT — LOCATION ZONE DERM
LOCATION ZONE: LIP
LOCATION ZONE: NOSE
LOCATION ZONE: FACE

## 2022-10-04 NOTE — PROCEDURE: LUMENIS M22
Number Of Passes: 2
Skin Type (Optional): III
Length Of Topical Anesthesia Application (Optional): 15 minutes
Fluence: 12
Pulse Duration (In Milliseconds): 3
Post-Care Instructions: I reviewed with the patient in detail post-care instructions. Patient should stay away from the sun and wear sun protection until treated areas are fully healed.
Total Pulses: 98
Consent: Written consent obtained, risks reviewed including but not limited to crusting, scabbing, blistering, scarring, darker or lighter pigmentary change, bruising, and/or incomplete response.
Treated Area: large area
Treatment Number: 1
Pulse Delay (In Milliseconds): 0
Fluence Units: J/cm2
Detail Level: Zone
Topical Anesthesia?: BLT cream (benzocaine 10%, lidocaine 4%, tetracaine 2%)
Procedure Text: The patient's skin was cleaned and the procedure was performed using the parameters noted above.

## 2022-10-27 ENCOUNTER — APPOINTMENT (OUTPATIENT)
Dept: URBAN - METROPOLITAN AREA CLINIC 320 | Age: 77
Setting detail: DERMATOLOGY
End: 2022-10-27

## 2022-10-27 DIAGNOSIS — Z41.9 ENCOUNTER FOR PROCEDURE FOR PURPOSES OTHER THAN REMEDYING HEALTH STATE, UNSPECIFIED: ICD-10-CM

## 2022-10-27 PROCEDURE — OTHER TEOSYAL REDENSITY 1 INJECTION: OTHER

## 2023-01-06 ENCOUNTER — APPOINTMENT (OUTPATIENT)
Dept: URBAN - METROPOLITAN AREA CLINIC 320 | Age: 78
Setting detail: DERMATOLOGY
End: 2023-01-06

## 2023-01-06 DIAGNOSIS — Z41.9 ENCOUNTER FOR PROCEDURE FOR PURPOSES OTHER THAN REMEDYING HEALTH STATE, UNSPECIFIED: ICD-10-CM

## 2023-01-06 PROCEDURE — OTHER RESTYLANE LYFT INJECTION: OTHER

## 2023-01-27 ENCOUNTER — APPOINTMENT (OUTPATIENT)
Dept: URBAN - METROPOLITAN AREA CLINIC 320 | Age: 78
Setting detail: DERMATOLOGY
End: 2023-01-27

## 2023-01-27 DIAGNOSIS — Z41.9 ENCOUNTER FOR PROCEDURE FOR PURPOSES OTHER THAN REMEDYING HEALTH STATE, UNSPECIFIED: ICD-10-CM

## 2023-01-27 PROCEDURE — OTHER LUMENIS M22: OTHER

## 2023-01-27 ASSESSMENT — LOCATION ZONE DERM
LOCATION ZONE: FACE
LOCATION ZONE: TRUNK
LOCATION ZONE: NECK
LOCATION ZONE: ARM
LOCATION ZONE: NOSE

## 2023-01-27 ASSESSMENT — LOCATION SIMPLE DESCRIPTION DERM
LOCATION SIMPLE: CHEST
LOCATION SIMPLE: LEFT CLAVICULAR SKIN
LOCATION SIMPLE: LEFT ANTERIOR NECK
LOCATION SIMPLE: RIGHT CHEEK
LOCATION SIMPLE: LEFT FOREHEAD
LOCATION SIMPLE: CHIN
LOCATION SIMPLE: RIGHT ANTERIOR NECK
LOCATION SIMPLE: NOSE
LOCATION SIMPLE: RIGHT FOREHEAD
LOCATION SIMPLE: LEFT SHOULDER
LOCATION SIMPLE: LEFT CHEEK
LOCATION SIMPLE: INFERIOR FOREHEAD

## 2023-01-27 ASSESSMENT — LOCATION DETAILED DESCRIPTION DERM
LOCATION DETAILED: RIGHT LATERAL SUPERIOR CHEST
LOCATION DETAILED: LEFT INFERIOR CENTRAL MALAR CHEEK
LOCATION DETAILED: RIGHT INFERIOR FOREHEAD
LOCATION DETAILED: UPPER STERNUM
LOCATION DETAILED: LEFT CLAVICULAR SKIN
LOCATION DETAILED: LEFT INFERIOR LATERAL FOREHEAD
LOCATION DETAILED: LEFT ANTERIOR SHOULDER
LOCATION DETAILED: LEFT INFERIOR LATERAL NECK
LOCATION DETAILED: LEFT LATERAL SUPERIOR CHEST
LOCATION DETAILED: NASAL DORSUM
LOCATION DETAILED: LEFT CHIN
LOCATION DETAILED: INFERIOR MID FOREHEAD
LOCATION DETAILED: RIGHT MEDIAL SUPERIOR CHEST
LOCATION DETAILED: RIGHT INFERIOR ANTERIOR NECK
LOCATION DETAILED: RIGHT CLAVICULAR NECK
LOCATION DETAILED: MIDDLE STERNUM
LOCATION DETAILED: LEFT INFERIOR ANTERIOR NECK
LOCATION DETAILED: RIGHT INFERIOR CENTRAL MALAR CHEEK

## 2023-01-27 NOTE — PROCEDURE: LUMENIS M22
Treatment Number: 1
Consent: Written consent obtained, risks reviewed including but not limited to crusting, scabbing, blistering, scarring, darker or lighter pigmentary change, bruising, and/or incomplete response.
Treated Area: large area
External Cooling Fan Speed: 0
Procedure Text: The patient's skin was cleaned and the procedure was performed using the parameters noted above.
Post-Care Instructions: I reviewed with the patient in detail post-care instructions. Patient should stay away from the sun and wear sun protection until treated areas are fully healed.
Fluence Units: J/cm2
Number Of Passes: 2
Fluence: 12
Topical Anesthesia?: BLT cream (benzocaine 8%, lidocaine 5%, tetracaine 4%)
Detail Level: Zone
Pulse Duration (In Milliseconds): 3
Total Pulses: 149
Skin Type (Optional): III

## 2023-02-07 NOTE — PROCEDURE: TEOSYAL RHA 2 INJECTION
Consent: Written consent obtained. Risks include but not limited to bruising, beading, irregular texture, ulceration, infection, allergic reaction, scar formation, incomplete augmentation, temporary nature, procedural pain. Resident

## 2023-04-13 ENCOUNTER — APPOINTMENT (OUTPATIENT)
Dept: URBAN - METROPOLITAN AREA CLINIC 320 | Age: 78
Setting detail: DERMATOLOGY
End: 2023-04-27

## 2023-04-13 DIAGNOSIS — Z41.9 ENCOUNTER FOR PROCEDURE FOR PURPOSES OTHER THAN REMEDYING HEALTH STATE, UNSPECIFIED: ICD-10-CM

## 2023-04-13 PROCEDURE — OTHER BOTOX: OTHER

## 2023-04-13 PROCEDURE — OTHER VOLBELLA INJECTION: OTHER

## 2023-04-13 ASSESSMENT — LOCATION DETAILED DESCRIPTION DERM
LOCATION DETAILED: RIGHT INFERIOR FOREHEAD
LOCATION DETAILED: LEFT SUPERIOR LATERAL MALAR CHEEK
LOCATION DETAILED: LEFT LATERAL EYEBROW
LOCATION DETAILED: LEFT INFERIOR FOREHEAD
LOCATION DETAILED: RIGHT SUPERIOR CENTRAL MALAR CHEEK
LOCATION DETAILED: RIGHT INFERIOR MEDIAL FOREHEAD
LOCATION DETAILED: GLABELLA
LOCATION DETAILED: LEFT INFERIOR TEMPLE
LOCATION DETAILED: LEFT INFERIOR MEDIAL FOREHEAD
LOCATION DETAILED: RIGHT MID TEMPLE
LOCATION DETAILED: RIGHT LATERAL EYEBROW
LOCATION DETAILED: RIGHT INFERIOR TEMPLE

## 2023-04-13 ASSESSMENT — LOCATION SIMPLE DESCRIPTION DERM
LOCATION SIMPLE: LEFT FOREHEAD
LOCATION SIMPLE: RIGHT FOREHEAD
LOCATION SIMPLE: RIGHT EYEBROW
LOCATION SIMPLE: RIGHT CHEEK
LOCATION SIMPLE: LEFT TEMPLE
LOCATION SIMPLE: GLABELLA
LOCATION SIMPLE: LEFT CHEEK
LOCATION SIMPLE: RIGHT TEMPLE
LOCATION SIMPLE: LEFT EYEBROW

## 2023-04-13 ASSESSMENT — LOCATION ZONE DERM: LOCATION ZONE: FACE

## 2023-04-27 ENCOUNTER — APPOINTMENT (OUTPATIENT)
Dept: URBAN - METROPOLITAN AREA CLINIC 320 | Age: 78
Setting detail: DERMATOLOGY
End: 2023-04-27

## 2023-04-27 DIAGNOSIS — Z41.9 ENCOUNTER FOR PROCEDURE FOR PURPOSES OTHER THAN REMEDYING HEALTH STATE, UNSPECIFIED: ICD-10-CM

## 2023-04-27 PROCEDURE — OTHER COSMETIC FOLLOW-UP: OTHER

## 2023-04-27 NOTE — PROCEDURE: COSMETIC FOLLOW-UP
Comments (Free Text): Patient 2 week follow up after Vobella and Botox treatment.  Patient states she is overall satisfied. However on the right cheek continues to see indentation.\\n\\nExplained to patient that may consider Sculptra $729 per syringe. Sculptra helps stimulate your natural collagen production. Slower process but may hold for longer time.\\n\\nPatient verbalized understanding.\\n\\nNo charge
Detail Level: Zone

## 2023-08-11 ENCOUNTER — APPOINTMENT (OUTPATIENT)
Dept: URBAN - METROPOLITAN AREA CLINIC 312 | Age: 78
Setting detail: DERMATOLOGY
End: 2023-08-11

## 2023-08-11 DIAGNOSIS — Z41.9 ENCOUNTER FOR PROCEDURE FOR PURPOSES OTHER THAN REMEDYING HEALTH STATE, UNSPECIFIED: ICD-10-CM

## 2023-08-11 PROCEDURE — OTHER COSMETIC CONSULTATION: GENERAL: OTHER

## 2023-08-11 PROCEDURE — OTHER MICRODERMABRASION: OTHER

## 2023-08-11 ASSESSMENT — LOCATION DETAILED DESCRIPTION DERM
LOCATION DETAILED: LEFT CHIN
LOCATION DETAILED: LEFT INFERIOR CENTRAL MALAR CHEEK
LOCATION DETAILED: RIGHT MEDIAL FOREHEAD
LOCATION DETAILED: RIGHT INFERIOR CENTRAL MALAR CHEEK
LOCATION DETAILED: NASAL DORSUM

## 2023-08-11 ASSESSMENT — LOCATION SIMPLE DESCRIPTION DERM
LOCATION SIMPLE: LEFT CHEEK
LOCATION SIMPLE: CHIN
LOCATION SIMPLE: RIGHT CHEEK
LOCATION SIMPLE: RIGHT FOREHEAD
LOCATION SIMPLE: NOSE

## 2023-08-11 ASSESSMENT — LOCATION ZONE DERM
LOCATION ZONE: NOSE
LOCATION ZONE: FACE

## 2023-08-11 NOTE — PROCEDURE: MICRODERMABRASION
Indication: skin texture
Number Of Passes: 6
Crystal Flow: 30
Vacuum Pressure: 10
Prep Text: The patient's skin was cleaned and prepped.
Treatment Number: 1
Wand: Fine
Endpoint: mild erythema
Detail Level: Detailed
Consent: Written consent obtained, risks reviewed including but not limited to crusting, scabbing, blistering, scarring, darker or lighter pigmentary change, bruising, and/or incomplete response.

## 2023-08-25 ENCOUNTER — APPOINTMENT (OUTPATIENT)
Dept: URBAN - METROPOLITAN AREA CLINIC 312 | Age: 78
Setting detail: DERMATOLOGY
End: 2023-08-25

## 2023-09-06 ENCOUNTER — APPOINTMENT (OUTPATIENT)
Dept: URBAN - METROPOLITAN AREA CLINIC 312 | Age: 78
Setting detail: DERMATOLOGY
End: 2023-09-06

## 2023-09-06 DIAGNOSIS — Z41.9 ENCOUNTER FOR PROCEDURE FOR PURPOSES OTHER THAN REMEDYING HEALTH STATE, UNSPECIFIED: ICD-10-CM

## 2023-09-06 PROCEDURE — OTHER COSMETIC CONSULTATION: GENERAL: OTHER

## 2023-09-21 ENCOUNTER — APPOINTMENT (OUTPATIENT)
Dept: URBAN - METROPOLITAN AREA CLINIC 312 | Age: 78
Setting detail: DERMATOLOGY
End: 2023-09-25

## 2023-09-21 ENCOUNTER — APPOINTMENT (OUTPATIENT)
Dept: URBAN - METROPOLITAN AREA CLINIC 312 | Age: 78
Setting detail: DERMATOLOGY
End: 2023-09-21

## 2023-09-21 DIAGNOSIS — Z41.9 ENCOUNTER FOR PROCEDURE FOR PURPOSES OTHER THAN REMEDYING HEALTH STATE, UNSPECIFIED: ICD-10-CM

## 2023-09-21 PROCEDURE — OTHER FILLERS: OTHER

## 2023-10-05 ENCOUNTER — APPOINTMENT (OUTPATIENT)
Dept: URBAN - METROPOLITAN AREA CLINIC 312 | Age: 78
Setting detail: DERMATOLOGY
End: 2023-10-05

## 2023-10-05 DIAGNOSIS — Z41.9 ENCOUNTER FOR PROCEDURE FOR PURPOSES OTHER THAN REMEDYING HEALTH STATE, UNSPECIFIED: ICD-10-CM

## 2023-10-05 PROCEDURE — OTHER LUMENIS M22: OTHER

## 2023-10-05 ASSESSMENT — LOCATION DETAILED DESCRIPTION DERM
LOCATION DETAILED: RIGHT PROXIMAL DORSAL FOREARM
LOCATION DETAILED: RIGHT RADIAL DORSAL HAND
LOCATION DETAILED: LEFT ULNAR DORSAL HAND
LOCATION DETAILED: RIGHT DISTAL DORSAL FOREARM
LOCATION DETAILED: LEFT PROXIMAL DORSAL FOREARM

## 2023-10-05 ASSESSMENT — LOCATION SIMPLE DESCRIPTION DERM
LOCATION SIMPLE: LEFT FOREARM
LOCATION SIMPLE: RIGHT FOREARM
LOCATION SIMPLE: RIGHT HAND
LOCATION SIMPLE: LEFT HAND

## 2023-10-05 ASSESSMENT — LOCATION ZONE DERM
LOCATION ZONE: HAND
LOCATION ZONE: ARM

## 2023-10-05 NOTE — PROCEDURE: LUMENIS M22
Total Pulses: 234
Pulse Delay (In Milliseconds): 0
Post-Care Instructions: I reviewed with the patient in detail post-care instructions. Patient should stay away from the sun and wear sun protection until treated areas are fully healed.
Treated Area: large area
Detail Level: Zone
Consent: Written consent obtained, risks reviewed including but not limited to crusting, scabbing, blistering, scarring, darker or lighter pigmentary change, bruising, and/or incomplete response.
Topical Anesthesia?: BLT cream (benzocaine 8%, lidocaine 5%, tetracaine 4%)
Length Of Topical Anesthesia Application (Optional): 20 minutes
Fluence: 13
Fluence Units: J/cm2
Procedure Text: The patient's skin was cleaned and the procedure was performed using the parameters noted above.
Pulse Duration (In Milliseconds): 3
Skin Type (Optional): III
Number Of Passes: 2
Treatment Number: 1

## 2023-11-02 ENCOUNTER — APPOINTMENT (OUTPATIENT)
Dept: URBAN - METROPOLITAN AREA CLINIC 312 | Age: 78
Setting detail: DERMATOLOGY
End: 2023-11-02

## 2023-11-02 DIAGNOSIS — Z41.9 ENCOUNTER FOR PROCEDURE FOR PURPOSES OTHER THAN REMEDYING HEALTH STATE, UNSPECIFIED: ICD-10-CM

## 2023-11-02 PROCEDURE — OTHER LUMENIS M22: OTHER

## 2023-11-02 ASSESSMENT — LOCATION DETAILED DESCRIPTION DERM
LOCATION DETAILED: RIGHT DISTAL POSTERIOR UPPER ARM
LOCATION DETAILED: LEFT ULNAR DORSAL HAND
LOCATION DETAILED: LEFT VENTRAL LATERAL DISTAL FOREARM
LOCATION DETAILED: RIGHT VENTRAL DISTAL FOREARM
LOCATION DETAILED: LEFT DISTAL DORSAL FOREARM
LOCATION DETAILED: LEFT ELBOW
LOCATION DETAILED: STERNAL NOTCH
LOCATION DETAILED: RIGHT VENTRAL PROXIMAL FOREARM
LOCATION DETAILED: RIGHT RADIAL DORSAL HAND
LOCATION DETAILED: LEFT VENTRAL PROXIMAL FOREARM
LOCATION DETAILED: RIGHT DISTAL RADIAL DORSAL FOREARM
LOCATION DETAILED: LEFT DISTAL POSTERIOR UPPER ARM
LOCATION DETAILED: RIGHT PROXIMAL DORSAL FOREARM
LOCATION DETAILED: LEFT PROXIMAL DORSAL FOREARM

## 2023-11-02 ASSESSMENT — LOCATION SIMPLE DESCRIPTION DERM
LOCATION SIMPLE: RIGHT FOREARM
LOCATION SIMPLE: LEFT POSTERIOR UPPER ARM
LOCATION SIMPLE: LEFT HAND
LOCATION SIMPLE: RIGHT POSTERIOR UPPER ARM
LOCATION SIMPLE: LEFT FOREARM
LOCATION SIMPLE: LEFT ELBOW
LOCATION SIMPLE: RIGHT HAND
LOCATION SIMPLE: CHEST

## 2023-11-02 ASSESSMENT — LOCATION ZONE DERM
LOCATION ZONE: HAND
LOCATION ZONE: ARM
LOCATION ZONE: TRUNK

## 2023-11-02 NOTE — PROCEDURE: LUMENIS M22
Pulse Duration (In Milliseconds): 3
Number Of Passes: 2
Post-Care Instructions: I reviewed with the patient in detail post-care instructions. Patient should stay away from the sun and wear sun protection until treated areas are fully healed.
External Cooling Fan Speed: 0
Total Pulses: 98
Treatment Number: 1
Length Of Topical Anesthesia Application (Optional): 20 minutes
Fluence Units: J/cm2
Topical Anesthesia?: BLT (benzocaine 23%, lidocaine 10%, tetracaine 10%)
Detail Level: Zone
Fluence: 14
Procedure Text: The patient's skin was cleaned and the procedure was performed using the parameters noted above.
Consent: Written consent obtained, risks reviewed including but not limited to crusting, scabbing, blistering, scarring, darker or lighter pigmentary change, bruising, and/or incomplete response.
Skin Type (Optional): IV
Treated Area: large area

## 2023-11-20 ENCOUNTER — APPOINTMENT (OUTPATIENT)
Dept: URBAN - METROPOLITAN AREA CLINIC 310 | Age: 78
Setting detail: DERMATOLOGY
End: 2023-11-21

## 2023-11-20 DIAGNOSIS — L73.8 OTHER SPECIFIED FOLLICULAR DISORDERS: ICD-10-CM

## 2023-11-20 DIAGNOSIS — L82.1 OTHER SEBORRHEIC KERATOSIS: ICD-10-CM

## 2023-11-20 DIAGNOSIS — Z12.83 ENCOUNTER FOR SCREENING FOR MALIGNANT NEOPLASM OF SKIN: ICD-10-CM

## 2023-11-20 DIAGNOSIS — Z41.9 ENCOUNTER FOR PROCEDURE FOR PURPOSES OTHER THAN REMEDYING HEALTH STATE, UNSPECIFIED: ICD-10-CM

## 2023-11-20 PROCEDURE — OTHER ADDITIONAL NOTES: OTHER

## 2023-11-20 PROCEDURE — OTHER REASSURANCE: OTHER

## 2023-11-20 PROCEDURE — OTHER COUNSELING: OTHER

## 2023-11-20 PROCEDURE — 99203 OFFICE O/P NEW LOW 30 MIN: CPT

## 2023-11-20 PROCEDURE — OTHER SUNSCREEN RECOMMENDATIONS: OTHER

## 2023-11-20 PROCEDURE — OTHER MIPS QUALITY: OTHER

## 2023-11-20 PROCEDURE — OTHER COSMETIC CONSULTATION: FILLERS: OTHER

## 2023-11-20 ASSESSMENT — LOCATION DETAILED DESCRIPTION DERM
LOCATION DETAILED: RIGHT CENTRAL MALAR CHEEK
LOCATION DETAILED: RIGHT SUPERIOR MEDIAL BUCCAL CHEEK
LOCATION DETAILED: RIGHT LATERAL FOREHEAD
LOCATION DETAILED: RIGHT SUPERIOR FOREHEAD
LOCATION DETAILED: RIGHT INFERIOR CENTRAL MALAR CHEEK

## 2023-11-20 ASSESSMENT — LOCATION ZONE DERM: LOCATION ZONE: FACE

## 2023-11-20 ASSESSMENT — LOCATION SIMPLE DESCRIPTION DERM
LOCATION SIMPLE: RIGHT FOREHEAD
LOCATION SIMPLE: RIGHT CHEEK

## 2023-11-20 NOTE — PROCEDURE: ADDITIONAL NOTES
Detail Level: Simple
Additional Notes: The lesions of concern for patient appear as possible Maru effect due to  the dermis.  This is slightly palpable on exam, however not very noticeable if attention is not purposely directed to the corners of mouth.  I have recommended patient massage the area and give it time, as this hyaluronic acid based filler will inevitably wear down and dissolve back to a baseline state.\\n\\nAdditional filler onto periosteum to right cheek apex can help alleviate the small slightly noticeable depression in this area, however again, I do not fully appreciate any mckinnon asymmetry to patients face and have warned about overfilling which certainly can give an adverse appearance.\\n\\nWe also discussed Volux to the bilateral mandibular angle and lateral chin area.
Render Risk Assessment In Note?: no

## 2024-01-10 ENCOUNTER — APPOINTMENT (OUTPATIENT)
Dept: URBAN - METROPOLITAN AREA CLINIC 312 | Age: 79
Setting detail: DERMATOLOGY
End: 2024-01-10

## 2024-01-10 ENCOUNTER — RX ONLY (RX ONLY)
Age: 79
End: 2024-01-10

## 2024-01-10 DIAGNOSIS — Z41.9 ENCOUNTER FOR PROCEDURE FOR PURPOSES OTHER THAN REMEDYING HEALTH STATE, UNSPECIFIED: ICD-10-CM

## 2024-01-10 PROCEDURE — OTHER COSMETIC CONSULTATION: GENERAL: OTHER

## 2024-01-10 RX ORDER — TRETIONIN 1 MG/G
0.1% CREAM TOPICAL
Qty: 1 | Refills: 11 | Status: ERX | COMMUNITY
Start: 2024-01-10

## 2024-03-03 NOTE — PROCEDURE: BELOTERO INJECTION
*3/3-Per DR, put a long leg post mold on patient RT. leg   Topical Anesthesia?: 10% benzocaine, 3% lidocaine, 2% tetracaine, 0.01% phenylephrine

## 2024-03-05 ENCOUNTER — APPOINTMENT (OUTPATIENT)
Dept: URBAN - METROPOLITAN AREA CLINIC 312 | Age: 79
Setting detail: DERMATOLOGY
End: 2024-03-05

## 2024-03-05 DIAGNOSIS — Z41.9 ENCOUNTER FOR PROCEDURE FOR PURPOSES OTHER THAN REMEDYING HEALTH STATE, UNSPECIFIED: ICD-10-CM

## 2024-03-05 PROCEDURE — OTHER FILLERS: OTHER

## 2024-03-05 NOTE — PROCEDURE: FILLERS
Nasolabial Folds Filler Volume In Cc: 0
Map Statment: See Attach Map for Complete Details
Additional Area 1 Location: bulb of nose
Number Of Syringes (Required For Inventory): 1
Filler: RHA 3
Include Cannula Size?: 27G
Include Cannula Information In Note?: No
Consent: Written consent obtained. Risks include but not limited to bruising, beading, irregular texture, ulceration, infection, allergic reaction, scar formation, incomplete augmentation, temporary nature, procedural pain.
Post-Care Instructions: Patient instructed to apply ice to reduce swelling.
Include Cannula Length?: 1 inch
Inventory Information: This plan will send filler information to inventory based on the fillers you select. Multiple fillers can be sent but you must ensure you select the appropriate fillers in the inventory tab.
Detail Level: Detailed
Anesthesia Type: 1% lidocaine without epinephrine and 0.9% sodium chloride in a 1:1 solution
Show Inventory Tab: Show
Lot #: [id number]
Anesthesia Volume In Cc: 0.5
Expiration Date (Month Year): 2023-12-28

## 2024-03-27 ENCOUNTER — APPOINTMENT (OUTPATIENT)
Dept: URBAN - METROPOLITAN AREA CLINIC 312 | Age: 79
Setting detail: DERMATOLOGY
End: 2024-03-27

## 2024-03-27 DIAGNOSIS — Z41.9 ENCOUNTER FOR PROCEDURE FOR PURPOSES OTHER THAN REMEDYING HEALTH STATE, UNSPECIFIED: ICD-10-CM

## 2024-03-27 PROCEDURE — OTHER FILLERS: OTHER

## 2024-03-27 NOTE — PROCEDURE: FILLERS
Cheeks Filler Volume In Cc: 0
Show Inventory Tab: Show
Include Cannula Information In Note?: No
Anesthesia Type: 1% lidocaine without epinephrine and 0.9% sodium chloride in a 1:1 solution
Marionette Lines Filler Volume In Cc: 1
Lot #: [id number]
Expiration Date (Month Year): 2023-12-28
Post-Care Instructions: Patient instructed to apply ice to reduce swelling.
Consent: Written consent obtained. Risks include but not limited to bruising, beading, irregular texture, ulceration, infection, allergic reaction, scar formation, incomplete augmentation, temporary nature, procedural pain.
Anesthesia Volume In Cc: 0.5
Map Statment: See Attach Map for Complete Details
Additional Area 1 Location: glabella
Filler: Restylane Contour
Include Cannula Size?: 27G
Inventory Information: This plan will send filler information to inventory based on the fillers you select. Multiple fillers can be sent but you must ensure you select the appropriate fillers in the inventory tab.
Detail Level: Detailed
Include Cannula Length?: 1 inch

## 2024-05-14 ENCOUNTER — APPOINTMENT (OUTPATIENT)
Dept: URBAN - METROPOLITAN AREA CLINIC 312 | Age: 79
Setting detail: DERMATOLOGY
End: 2024-05-14

## 2024-05-14 DIAGNOSIS — Z41.9 ENCOUNTER FOR PROCEDURE FOR PURPOSES OTHER THAN REMEDYING HEALTH STATE, UNSPECIFIED: ICD-10-CM

## 2024-05-14 PROCEDURE — OTHER COSMETIC CONSULTATION: GENERAL: OTHER

## 2024-05-31 ENCOUNTER — APPOINTMENT (OUTPATIENT)
Dept: URBAN - METROPOLITAN AREA CLINIC 312 | Age: 79
Setting detail: DERMATOLOGY
End: 2024-05-31

## 2024-05-31 DIAGNOSIS — Z41.9 ENCOUNTER FOR PROCEDURE FOR PURPOSES OTHER THAN REMEDYING HEALTH STATE, UNSPECIFIED: ICD-10-CM

## 2024-05-31 PROCEDURE — OTHER SECRET RF: OTHER

## 2024-05-31 ASSESSMENT — LOCATION ZONE DERM
LOCATION ZONE: FACE
LOCATION ZONE: NECK
LOCATION ZONE: NOSE

## 2024-05-31 ASSESSMENT — LOCATION DETAILED DESCRIPTION DERM
LOCATION DETAILED: LEFT CHIN
LOCATION DETAILED: SUBMENTAL CHIN
LOCATION DETAILED: LEFT FOREHEAD
LOCATION DETAILED: LEFT INFERIOR CENTRAL MALAR CHEEK
LOCATION DETAILED: NASAL DORSUM
LOCATION DETAILED: LEFT CENTRAL LATERAL NECK
LOCATION DETAILED: INFERIOR MID FOREHEAD
LOCATION DETAILED: RIGHT CENTRAL MALAR CHEEK
LOCATION DETAILED: RIGHT FOREHEAD

## 2024-05-31 ASSESSMENT — LOCATION SIMPLE DESCRIPTION DERM
LOCATION SIMPLE: CHIN
LOCATION SIMPLE: NOSE
LOCATION SIMPLE: NECK
LOCATION SIMPLE: RIGHT CHEEK
LOCATION SIMPLE: RIGHT FOREHEAD
LOCATION SIMPLE: INFERIOR FOREHEAD
LOCATION SIMPLE: LEFT FOREHEAD
LOCATION SIMPLE: SUBMENTAL CHIN
LOCATION SIMPLE: LEFT CHEEK

## 2024-05-31 NOTE — PROCEDURE: SECRET RF
Depth: 2
Treatment Number: 1
Lot #: [id number]
Consent: Written consent obtained.  Risks reviewed including but not limited to erythema, swelling, crusting, scabbing, blistering, scarring, and darker or lighter pigmentary change.  Patient understands that results are not guaranteed and multiple treatments may be needed to achieve desired result.
Number Of Prepaid Treatments (Will Not Render If 0): 4
Procedure Note: Treatment was administered using the setting parameters listed above.
Expiration Date (Month Year): 2023-08-10
Post-Care Instructions: I reviewed with the patient in detail post-care instructions. Patient should avoid sun exposure before and after treatment.  Diligent sunscreen use and sun avoidance advised.
Intensity (%): 40%
Rf (Ms): 300ms
Depth: 2.5
Anesthesia Type: 1% lidocaine with epinephrine
Add Another Setting?: no
Detail Level: Zone
Tip: 64-pin semi-insulated
Rf (Ms): 230
External Cooling Fan Speed: 0

## 2024-08-21 ENCOUNTER — RX ONLY (RX ONLY)
Age: 79
End: 2024-08-21

## 2024-08-21 ENCOUNTER — APPOINTMENT (OUTPATIENT)
Dept: URBAN - METROPOLITAN AREA CLINIC 312 | Age: 79
Setting detail: DERMATOLOGY
End: 2024-08-21

## 2024-08-21 DIAGNOSIS — Z41.9 ENCOUNTER FOR PROCEDURE FOR PURPOSES OTHER THAN REMEDYING HEALTH STATE, UNSPECIFIED: ICD-10-CM

## 2024-08-21 PROCEDURE — OTHER FILLERS: OTHER

## 2024-08-21 PROCEDURE — OTHER BOTOX: OTHER

## 2024-08-21 RX ORDER — TRETIONIN 1 MG/G
0.1% CREAM TOPICAL
Qty: 1 | Refills: 11 | Status: ERX

## 2024-08-21 ASSESSMENT — LOCATION SIMPLE DESCRIPTION DERM
LOCATION SIMPLE: RIGHT TEMPLE
LOCATION SIMPLE: LEFT CHEEK
LOCATION SIMPLE: LEFT TEMPLE
LOCATION SIMPLE: RIGHT CHEEK

## 2024-08-21 ASSESSMENT — LOCATION DETAILED DESCRIPTION DERM
LOCATION DETAILED: RIGHT MID TEMPLE
LOCATION DETAILED: RIGHT SUPERIOR CENTRAL MALAR CHEEK
LOCATION DETAILED: RIGHT INFERIOR TEMPLE
LOCATION DETAILED: LEFT MID TEMPLE
LOCATION DETAILED: LEFT INFERIOR TEMPLE
LOCATION DETAILED: LEFT SUPERIOR LATERAL MALAR CHEEK

## 2024-08-21 ASSESSMENT — LOCATION ZONE DERM: LOCATION ZONE: FACE

## 2024-08-21 NOTE — PROCEDURE: BOTOX
Show Right And Left Brow Units: No
Show Levator Superior Units: Yes
Anterior Platysmal Bands Units: 0
Additional Area 1 Location: left upper lip
Periorbital Skin Units: 12
Consent: Written consent obtained. Risks include but not limited to lid/brow ptosis, bruising, swelling, diplopia, temporary effect, incomplete chemical denervation.
Detail Level: Detailed
Post-Care Instructions: Patient instructed to not lie down for 4 hours and limit physical activity for 24 hours.
Incrementing Botox Units: By 0.5 Units
Show Inventory Tab: Show
Lot #: [id number]
Dilution (U/0.1 Cc): 4

## 2024-08-21 NOTE — PROCEDURE: FILLERS
Mid Face Filler Volume In Cc: 0
Number Of Syringes (Required For Inventory): 1
Include Cannula Information In Note?: No
Lot #: [id number]
Marionette Lines Filler Volume In Cc: 0.5
Show Inventory Tab: Show
Anesthesia Type: 1% lidocaine without epinephrine and 0.9% sodium chloride in a 1:1 solution
Detail Level: Detailed
Filler: Juvederm Voluma XC
Inventory Information: This plan will send filler information to inventory based on the fillers you select. Multiple fillers can be sent but you must ensure you select the appropriate fillers in the inventory tab.
Additional Area 1 Location: under right eye
Post-Care Instructions: Patient instructed to apply ice to reduce swelling.
Map Statment: See Attach Map for Complete Details
Consent: Written consent obtained. Risks include but not limited to bruising, beading, irregular texture, ulceration, infection, allergic reaction, scar formation, incomplete augmentation, temporary nature, procedural pain.
Expiration Date (Month Year): 2023-12-28

## 2024-09-03 ENCOUNTER — APPOINTMENT (OUTPATIENT)
Dept: URBAN - METROPOLITAN AREA CLINIC 312 | Age: 79
Setting detail: DERMATOLOGY
End: 2024-09-03

## 2024-09-03 DIAGNOSIS — Z41.9 ENCOUNTER FOR PROCEDURE FOR PURPOSES OTHER THAN REMEDYING HEALTH STATE, UNSPECIFIED: ICD-10-CM

## 2024-09-03 PROCEDURE — OTHER FILLERS: OTHER

## 2024-09-03 PROCEDURE — OTHER COSMETIC CONSULTATION: GENERAL: OTHER

## 2024-09-03 NOTE — PROCEDURE: FILLERS
Additional Area 4 Volume In Cc: 0
Show Inventory Tab: Show
Additional Area 1 Location: under right eye
Number Of Syringes (Required For Inventory): 1
Filler: Restylane Contour
Anesthesia Type: 1% lidocaine without epinephrine and 0.9% sodium chloride in a 1:1 solution
Include Cannula Information In Note?: No
Cheeks Filler Volume In Cc: 0.2
Anesthesia Volume In Cc: 0.5
Map Statment: See Attach Map for Complete Details
Lot #: [id number]
Marionette Lines Filler Volume In Cc: 0.8
Expiration Date (Month Year): 2023-12-28
Consent: Written consent obtained. Risks include but not limited to bruising, beading, irregular texture, ulceration, infection, allergic reaction, scar formation, incomplete augmentation, temporary nature, procedural pain.
Inventory Information: This plan will send filler information to inventory based on the fillers you select. Multiple fillers can be sent but you must ensure you select the appropriate fillers in the inventory tab.
Post-Care Instructions: Patient instructed to apply ice to reduce swelling.
Detail Level: Detailed

## 2024-10-25 ENCOUNTER — APPOINTMENT (OUTPATIENT)
Dept: URBAN - METROPOLITAN AREA CLINIC 312 | Age: 79
Setting detail: DERMATOLOGY
End: 2024-10-25

## 2024-10-25 DIAGNOSIS — Z41.9 ENCOUNTER FOR PROCEDURE FOR PURPOSES OTHER THAN REMEDYING HEALTH STATE, UNSPECIFIED: ICD-10-CM

## 2024-10-25 PROCEDURE — OTHER THERMAGE CPT: OTHER

## 2024-10-25 ASSESSMENT — LOCATION SIMPLE DESCRIPTION DERM
LOCATION SIMPLE: RIGHT CHEEK
LOCATION SIMPLE: RIGHT FOREHEAD
LOCATION SIMPLE: INFERIOR FOREHEAD
LOCATION SIMPLE: NOSE
LOCATION SIMPLE: LEFT CHEEK
LOCATION SIMPLE: CHIN
LOCATION SIMPLE: LEFT FOREHEAD

## 2024-10-25 ASSESSMENT — LOCATION DETAILED DESCRIPTION DERM
LOCATION DETAILED: LEFT INFERIOR FOREHEAD
LOCATION DETAILED: LEFT CENTRAL BUCCAL CHEEK
LOCATION DETAILED: RIGHT INFERIOR FOREHEAD
LOCATION DETAILED: RIGHT INFERIOR CENTRAL MALAR CHEEK
LOCATION DETAILED: RIGHT CHIN
LOCATION DETAILED: NASAL SUPRATIP
LOCATION DETAILED: INFERIOR MID FOREHEAD
LOCATION DETAILED: LEFT INFERIOR CENTRAL MALAR CHEEK
LOCATION DETAILED: RIGHT CENTRAL BUCCAL CHEEK

## 2024-10-25 ASSESSMENT — LOCATION ZONE DERM
LOCATION ZONE: NOSE
LOCATION ZONE: FACE

## 2024-10-25 NOTE — PROCEDURE: THERMAGE CPT
Pre-Procedures Photographs: Yes
Immediate Post-Procedure Findings: mild erythema, no edema
Post-Care Instructions: I reviewed with the patient in detail post-care instructions. Patient should stay away from the sun and wear sun protection until treated areas are fully healed.
Vibration Level Tolerated: 1
Thermage Tip: Face Tip 3.0
Minimum Treatment Setting: 10
Repetitions: 500
Procedure Note: The patient identified the areas which were most problematic. These areas were then evaluated and appropriate vectors were drawn on the skin (if applicable).  The treatment area was then cleaned and a treatment grid was applied using grid marking paper.  A return pad was placed appropriately on the patient and coupling gel was applied to the treatment area prior to starting the procedure.  The procedure was performed with the following parameters:
Treatment Endpoint: visual tightening
Patient Discomfort: no
Repetitions: 900
Post-Procedure Text: Vaseline and ice applied. Post care reviewed with patient.
Consent: Written consent obtained, risks reviewed including but not limited to crusting, scabbing, blistering, scarring, darker or lighter pigmentary change, and/or incomplete removal.
Maximum Treatment Settin
Thermage Tip: Body Tip 3.0
Immediate Post-Procedure Findings: no edema or erythema
Detail Level: Zone
Thermage Tip- Expiration Date (Optional): 2024-07-08
Thermage Tip- Lot # (Optional): [id number]

## 2024-11-11 NOTE — PROCEDURE: TREATMENT REGIMEN
Paulina Gaona (:  1953) is a Established patient, here for evaluation of the following:    Cough (X6 days, cough with crackle sounds, lots of drainage )       Assessment & Plan:  Below is the assessment and plan developed based on review of pertinent history, physical exam, labs, studies, and medications.  1. Acute URI  -     brompheniramine-pseudoephedrine-DM 2-30-10 MG/5ML syrup; Take 5-10 mLs by mouth 4 times daily as needed for Cough, Disp-200 mL, R-0Normal  Increase fluids, Rest, Use OTC pain reliever/fever reducer of choice, Use mucinex for congestion, Use a humidifier in your bedroom.  The patient was educated on reasons to seek urgent medical care including chest pain, shortness of breath or difficulty breathing at rest, severe pain, fever >102 not controlled by medication, blue-tinged lips or nailbeds, and severe weakness or confusion. Patient verbalized understanding  The patient would benefit from future follow up with their usual PCP. As of the end of their Virtualist Visit today, follow up visit status is as follows: Patient to follow up as previously instructed by PCP      Return if symptoms worsen or fail to improve.    Subjective:   Last week she had a headache, body aches, fever. Also has sinus drainage. Crackling when exhaling and in between breath sounds, started yesterday. Feels like she coughed a lot of mucus up last night and feels better today. Has significant PND but that is also improving.     Cough  This is a new problem. The current episode started in the past 7 days. The problem has been gradually improving. The problem occurs every few minutes. The cough is Productive of sputum. Associated symptoms include headaches (resolved), myalgias and postnasal drip. Pertinent negatives include no chills, ear congestion, ear pain, fever (resolved), sore throat, shortness of breath or wheezing.     Review of Systems   Constitutional:  Negative for chills and fever (resolved).   HENT:  
Plan: Recommend compression legging or compression wrap to apply slight pressure to the area to tamponade any slow and persistent subcutaneous bleeding.  Depression to the area is likely due to subdermal atrophy due to steroid injection.  We will refer to vascular surgery for further evaluation.  There is chance that architecture of knee is causing subdermal trauma, as no history of trauma to area is appreciated.  Pt also on ASA. \\n\\nWill refer patient to Dr. Etienne Castro vascular surgery for further evaluation of chronic left lower leg bruising.
Detail Level: Detailed

## 2024-11-12 ENCOUNTER — APPOINTMENT (OUTPATIENT)
Dept: URBAN - METROPOLITAN AREA CLINIC 312 | Age: 79
Setting detail: DERMATOLOGY
End: 2024-11-12

## 2024-11-12 DIAGNOSIS — Z41.9 ENCOUNTER FOR PROCEDURE FOR PURPOSES OTHER THAN REMEDYING HEALTH STATE, UNSPECIFIED: ICD-10-CM

## 2024-11-12 PROCEDURE — OTHER COSMETIC FOLLOW-UP: OTHER

## 2024-11-12 PROCEDURE — OTHER BOTOX: OTHER

## 2024-11-12 ASSESSMENT — LOCATION ZONE DERM: LOCATION ZONE: FACE

## 2024-11-12 ASSESSMENT — LOCATION DETAILED DESCRIPTION DERM
LOCATION DETAILED: LEFT INFERIOR MEDIAL FOREHEAD
LOCATION DETAILED: LEFT INFERIOR FOREHEAD
LOCATION DETAILED: INFERIOR MID FOREHEAD
LOCATION DETAILED: RIGHT INFERIOR MEDIAL FOREHEAD
LOCATION DETAILED: RIGHT CENTRAL EYEBROW

## 2024-11-12 ASSESSMENT — LOCATION SIMPLE DESCRIPTION DERM
LOCATION SIMPLE: RIGHT EYEBROW
LOCATION SIMPLE: INFERIOR FOREHEAD
LOCATION SIMPLE: LEFT FOREHEAD
LOCATION SIMPLE: RIGHT FOREHEAD

## 2024-11-12 NOTE — PROCEDURE: COSMETIC FOLLOW-UP
Comments (Free Text): Patient 2 week follow up after Thermage face / neck treatment.\\n\\nPost photos taken and complete. Patient pleased with treatment.\\n\\nNo charge
Detail Level: Zone

## 2024-11-12 NOTE — PROCEDURE: BOTOX
Inferior Lateral Orbicularis Oculi Units: 0
Post-Care Instructions: Patient instructed to not lie down for 4 hours and limit physical activity for 24 hours.
Show Glabellar Units: Yes
Show Right And Left Brow Units: No
Lot #: [id number]
Detail Level: Detailed
Additional Area 2 Location: chin
Dilution (U/0.1 Cc): 4
Incrementing Botox Units: By 0.5 Units
Show Inventory Tab: Show
Consent: Written consent obtained. Risks include but not limited to lid/brow ptosis, bruising, swelling, diplopia, temporary effect, incomplete chemical denervation.
Glabellar Complex Units: 10

## 2024-12-04 ENCOUNTER — APPOINTMENT (OUTPATIENT)
Dept: URBAN - METROPOLITAN AREA CLINIC 312 | Age: 79
Setting detail: DERMATOLOGY
End: 2024-12-04

## 2024-12-04 DIAGNOSIS — Z41.9 ENCOUNTER FOR PROCEDURE FOR PURPOSES OTHER THAN REMEDYING HEALTH STATE, UNSPECIFIED: ICD-10-CM

## 2024-12-04 PROCEDURE — OTHER BOTOX: OTHER

## 2024-12-04 PROCEDURE — OTHER FILLERS: OTHER

## 2024-12-04 PROCEDURE — OTHER COSMETIC FOLLOW-UP: OTHER

## 2024-12-04 ASSESSMENT — LOCATION DETAILED DESCRIPTION DERM: LOCATION DETAILED: RIGHT FOREHEAD

## 2024-12-04 ASSESSMENT — LOCATION ZONE DERM: LOCATION ZONE: FACE

## 2024-12-04 ASSESSMENT — LOCATION SIMPLE DESCRIPTION DERM: LOCATION SIMPLE: RIGHT FOREHEAD

## 2024-12-04 NOTE — PROCEDURE: FILLERS
Verm[place] Lips Filler Volume In Cc: 0
Lot #: [id number]
Inventory Information: This plan will send filler information to inventory based on the fillers you select. Multiple fillers can be sent but you must ensure you select the appropriate fillers in the inventory tab.
Expiration Date (Month Year): 2023-12-28
Detail Level: Detailed
Number Of Syringes (Required For Inventory): 1
Consent: Written consent obtained. Risks include but not limited to bruising, beading, irregular texture, ulceration, infection, allergic reaction, scar formation, incomplete augmentation, temporary nature, procedural pain.
Post-Care Instructions: Patient instructed to apply ice to reduce swelling.
Include Cannula Information In Note?: No
Show Inventory Tab: Show
Additional Area 1 Location: chin
Filler: Restylane Contour
Additional Area 2 Location: bridge of nose
Map Statment: See Attach Map for Complete Details
Anesthesia Type: 1% lidocaine without epinephrine and 0.9% sodium chloride in a 1:1 solution
Anesthesia Volume In Cc: 0.5

## 2024-12-04 NOTE — PROCEDURE: COSMETIC FOLLOW-UP
Detail Level: Zone
Comments (Free Text): Patient two week follow up after Botox treatment on glabella.\\n\\nPatient pleased with treatment slight movement.\\n\\nNo charge

## 2024-12-04 NOTE — PROCEDURE: BOTOX
Additional Area 1 Location: neck
Depressor Anguli Oris Units: 0
Show Additional Area 1: Yes
Lot #: [id number]
Show Lcl Units: No
Detail Level: Detailed
Incrementing Botox Units: By 0.5 Units
Dilution (U/0.1 Cc): 4
Show Inventory Tab: Show
Forehead Units: 2
Additional Area 2 Location: chin
Consent: Written consent obtained. Risks include but not limited to lid/brow ptosis, bruising, swelling, diplopia, temporary effect, incomplete chemical denervation.
Post-Care Instructions: Patient instructed to not lie down for 4 hours and limit physical activity for 24 hours.

## 2025-01-09 ENCOUNTER — APPOINTMENT (OUTPATIENT)
Dept: URBAN - METROPOLITAN AREA CLINIC 312 | Age: 80
Setting detail: DERMATOLOGY
End: 2025-01-09

## 2025-01-09 DIAGNOSIS — Z41.9 ENCOUNTER FOR PROCEDURE FOR PURPOSES OTHER THAN REMEDYING HEALTH STATE, UNSPECIFIED: ICD-10-CM

## 2025-01-09 PROCEDURE — OTHER COSMETIC CONSULTATION: GENERAL: OTHER

## 2025-05-15 ENCOUNTER — APPOINTMENT (OUTPATIENT)
Dept: URBAN - METROPOLITAN AREA CLINIC 312 | Age: 80
Setting detail: DERMATOLOGY
End: 2025-05-15

## 2025-05-15 DIAGNOSIS — Z41.9 ENCOUNTER FOR PROCEDURE FOR PURPOSES OTHER THAN REMEDYING HEALTH STATE, UNSPECIFIED: ICD-10-CM

## 2025-05-15 PROCEDURE — OTHER LUMENIS M22: OTHER

## 2025-05-15 ASSESSMENT — LOCATION DETAILED DESCRIPTION DERM
LOCATION DETAILED: RIGHT FOREHEAD
LOCATION DETAILED: RIGHT CENTRAL ANTERIOR NECK
LOCATION DETAILED: RIGHT SUBMANDIBULAR AREA
LOCATION DETAILED: RIGHT MEDIAL SUBMANDIBULAR CHEEK
LOCATION DETAILED: RIGHT INFERIOR CENTRAL MALAR CHEEK
LOCATION DETAILED: LEFT MEDIAL FOREHEAD
LOCATION DETAILED: SUBMENTAL CHIN
LOCATION DETAILED: NASAL DORSUM
LOCATION DETAILED: LEFT LATERAL FOREHEAD
LOCATION DETAILED: RIGHT UPPER CUTANEOUS LIP
LOCATION DETAILED: LEFT UPPER CUTANEOUS LIP
LOCATION DETAILED: LEFT CHIN
LOCATION DETAILED: LEFT SUBMANDIBULAR AREA
LOCATION DETAILED: LEFT CENTRAL LATERAL NECK
LOCATION DETAILED: RIGHT CENTRAL BUCCAL CHEEK
LOCATION DETAILED: LEFT INFERIOR CENTRAL MALAR CHEEK
LOCATION DETAILED: LEFT CENTRAL BUCCAL CHEEK

## 2025-05-15 ASSESSMENT — LOCATION SIMPLE DESCRIPTION DERM
LOCATION SIMPLE: NECK
LOCATION SIMPLE: RIGHT LIP
LOCATION SIMPLE: NOSE
LOCATION SIMPLE: LEFT SUBMANDIBULAR AREA
LOCATION SIMPLE: CHIN
LOCATION SIMPLE: LEFT CHEEK
LOCATION SIMPLE: LEFT FOREHEAD
LOCATION SIMPLE: LEFT LIP
LOCATION SIMPLE: RIGHT FOREHEAD
LOCATION SIMPLE: SUBMENTAL CHIN
LOCATION SIMPLE: RIGHT CHEEK
LOCATION SIMPLE: RIGHT SUBMANDIBULAR AREA

## 2025-05-15 ASSESSMENT — LOCATION ZONE DERM
LOCATION ZONE: LIP
LOCATION ZONE: NECK
LOCATION ZONE: NOSE
LOCATION ZONE: FACE

## 2025-05-20 ENCOUNTER — APPOINTMENT (OUTPATIENT)
Dept: URBAN - METROPOLITAN AREA CLINIC 312 | Age: 80
Setting detail: DERMATOLOGY
End: 2025-05-20

## 2025-05-20 DIAGNOSIS — Z41.9 ENCOUNTER FOR PROCEDURE FOR PURPOSES OTHER THAN REMEDYING HEALTH STATE, UNSPECIFIED: ICD-10-CM

## 2025-05-20 PROCEDURE — OTHER FILLERS: OTHER
